# Patient Record
Sex: FEMALE | Race: WHITE
[De-identification: names, ages, dates, MRNs, and addresses within clinical notes are randomized per-mention and may not be internally consistent; named-entity substitution may affect disease eponyms.]

---

## 2017-08-21 ENCOUNTER — HOSPITAL ENCOUNTER (OUTPATIENT)
Dept: HOSPITAL 47 - RADCTMAIN | Age: 42
End: 2017-08-21
Payer: COMMERCIAL

## 2017-08-21 DIAGNOSIS — N83.201: Primary | ICD-10-CM

## 2017-08-21 PROCEDURE — 74177 CT ABD & PELVIS W/CONTRAST: CPT

## 2017-08-21 NOTE — CT
EXAMINATION TYPE: CT abdomen pelvis w con

 

DATE OF EXAM: 8/21/2017

 

COMPARISON: NONE

 

HISTORY: Ovarian cyst and bloating

 

CT DLP: 414.1 mGycm

Automated exposure control for dose reduction was used.

 

CONTRAST: 

CT scan of the abdomen pelvis is performed with IV Contrast, patient injected with 100 mL of Omnipaqu
e 300.

 

FINDINGS- 

LUNG BASES-  No significant abnormality is appreciated. 

 

LIVER/GB-   No gross abnormality is appreciated.

 

PANCREAS-  No gross abnormality is seen. 

SPLEEN-  No gross abnormality is seen. 

 

ADRENALS-  No gross abnormality is seen.

 

KIDNEYS/BLADDER- no hydronephrosis nephrolithiasis or renal mass.

   

BOWEL-  no bowel dilatation.  Normal appendix. 

  

LYMPH NODES-  No greater than 1cm abdominal or pelvic lymph nodes are appreciated. 

 

OSSEOUS STRUCTURES-  No significant abnormality is seen. 

 

OTHER-  slightly lobulated pattern to the lower uterine segment can occasionally be seen with the fib
roid and could be correlated with ultrasound. 

 

IMPRESSION- 

1. Questionable uterine fibroid. Area of mixed density in the left adnexa some of which is hyperdense
 measuring 1.8 cm may be related to previous ovarian cyst, hemorrhagic cyst or ruptured cyst. Correla
te with ultrasound.

## 2017-08-31 ENCOUNTER — HOSPITAL ENCOUNTER (OUTPATIENT)
Dept: HOSPITAL 47 - ORWHC2ENDO | Age: 42
Discharge: HOME | End: 2017-08-31
Attending: SURGERY
Payer: COMMERCIAL

## 2017-08-31 VITALS — RESPIRATION RATE: 16 BRPM | HEART RATE: 62 BPM | DIASTOLIC BLOOD PRESSURE: 73 MMHG | SYSTOLIC BLOOD PRESSURE: 112 MMHG

## 2017-08-31 VITALS — TEMPERATURE: 98.8 F

## 2017-08-31 VITALS — BODY MASS INDEX: 23.7 KG/M2

## 2017-08-31 DIAGNOSIS — K58.9: Primary | ICD-10-CM

## 2017-08-31 DIAGNOSIS — K57.30: ICD-10-CM

## 2017-08-31 DIAGNOSIS — E07.9: ICD-10-CM

## 2017-08-31 DIAGNOSIS — Z80.0: ICD-10-CM

## 2017-08-31 DIAGNOSIS — Z79.899: ICD-10-CM

## 2017-08-31 DIAGNOSIS — K59.00: ICD-10-CM

## 2017-08-31 PROCEDURE — 81025 URINE PREGNANCY TEST: CPT

## 2017-08-31 PROCEDURE — 45378 DIAGNOSTIC COLONOSCOPY: CPT

## 2017-08-31 NOTE — P.GSHP
History of Present Illness


H&P Date: 17


Chief Complaint: Left lower quadrant abdominal pain, screening colonoscopy





This is a 42-year-old female referred from Julieta Mckay.  Patient is today for 

colonoscopy.  Patient has a family history of colon cancer.  She's had problems 

with left lower quadrant abdominal pain.  He had some intermittent constipation.





Past Medical History


Past Medical History: Thyroid Disorder


Additional Past Medical History / Comment(s): spastic colon,


History of Any Multi-Drug Resistant Organisms: None Reported


Past Surgical History: Breast Surgery,  Section, Orthopedic Surgery, 

Tonsillectomy


Additional Past Surgical History / Comment(s): left foot surgery, sander breast 

reduction


Past Anesthesia/Blood Transfusion Reactions: Motion Sickness


Smoking Status: Never smoker





- Past Family History


  ** Mother


Family Medical History: No Reported History





Medications and Allergies


 Home Medications











 Medication  Instructions  Recorded  Confirmed  Type


 


Levothyroxine Sodium [Synthroid] 25 mcg PO DAILY 17 History











 Allergies











Allergy/AdvReac Type Severity Reaction Status Date / Time


 


No Known Allergies Allergy   Verified 17 08:19














Surgical - Exam


 Vital Signs











Temp Pulse Resp BP Pulse Ox


 


 98.8 F   67   16   115/80   99 


 


 17 08:18  17 08:18  17 08:18  17 08:18  17 08:18














- General


well nourished, no distress





- Eyes


PERRL





- ENT


normal pinna





- Neck


no masses





- Respiratory


normal expansion





- Cardiovascular


Rhythm: regular





- Abdomen


Abdomen: soft, non tender





Assessment and Plan


Plan: 





Left lower quadrant abdominal pain.





Change in bowel habits





Family history: Cancer





We'll perform screening colonoscopy

## 2017-09-20 NOTE — P.OP
Date of Procedure: 08/31/17


Preoperative Diagnosis: 


left lower quadrant pain, screening colonoscopy


Postoperative Diagnosis: 


diverticulosis


Procedure(s) Performed: 


colonoscopy


Anesthesia: MAC


Surgeon: Armando Hall


Pathology: none sent


Condition: stable


Disposition: PACU


Description of Procedure: 


the patient was placed on the endoscopy table in the lateral position.  She 

received IV sedation.  Digital rectalexam revealed no abnormalities.the 

colonoscope was then placed the patient in this and passed throughout the 

entire colon.  The ileocecal valve was visualized.  The cecum appeared normal.  

The ascending and transverse colon appeared normal.   In the descending and 

sigmoid colon there was mild diverticulosis.  The scope was then brought back 

the rectum and the sternal.  Scope was then withdrawn for patient.

## 2017-11-07 ENCOUNTER — HOSPITAL ENCOUNTER (OUTPATIENT)
Dept: HOSPITAL 47 - RADMAMWWP | Age: 42
Discharge: HOME | End: 2017-11-07
Payer: COMMERCIAL

## 2017-11-07 DIAGNOSIS — Z12.31: Primary | ICD-10-CM

## 2017-11-08 NOTE — MM
Reason for exam: screening  (asymptomatic).



History:

Reductions of both breasts, 1992.



Physical Findings:

A clinical breast exam by your physician is recommended on an annual basis and 

results should be correlated with mammographic findings.



MG Screening Mammo w CAD

Bilateral CC and MLO view(s) were taken.

No prior studies available for comparison.

The breast tissue is heterogeneously dense. This may lower the sensitivity of 

mammography.  No suspicious abnormality.





ASSESSMENT: Negative, BI-RAD 1



RECOMMENDATION:

Routine screening mammogram of both breasts in 1 year.

## 2019-08-20 ENCOUNTER — HOSPITAL ENCOUNTER (OUTPATIENT)
Dept: HOSPITAL 47 - RADMAMWWP | Age: 44
Discharge: HOME | End: 2019-08-20
Attending: OBSTETRICS & GYNECOLOGY
Payer: COMMERCIAL

## 2019-08-20 DIAGNOSIS — Z12.31: Primary | ICD-10-CM

## 2019-08-20 PROCEDURE — 77067 SCR MAMMO BI INCL CAD: CPT

## 2019-08-22 NOTE — MM
Reason for exam: screening  (asymptomatic).

Last mammogram was performed 1 year and 9 months ago.



History:

Reductions of both breasts, 1992.



Physical Findings:

A clinical breast exam by your physician is recommended on an annual basis and 

results should be correlated with mammographic findings.



MG Screening Mammo w CAD

Bilateral CC and MLO view(s) were taken.

Prior study comparison: November 7, 2017, bilateral MG screening mammo w CAD.

The breast tissue is heterogeneously dense. This may lower the sensitivity of 

mammography.  No significant changes when compared with prior studies.





ASSESSMENT: Benign, BI-RAD 2



RECOMMENDATION:

Routine screening mammogram of both breasts in 1 year.

## 2022-07-26 ENCOUNTER — HOSPITAL ENCOUNTER (OUTPATIENT)
Dept: HOSPITAL 47 - LABWHC1 | Age: 47
Discharge: HOME | End: 2022-07-26
Attending: FAMILY MEDICINE
Payer: COMMERCIAL

## 2022-07-26 DIAGNOSIS — M79.18: Primary | ICD-10-CM

## 2022-07-26 DIAGNOSIS — Z82.0: ICD-10-CM

## 2022-07-26 DIAGNOSIS — Z84.0: ICD-10-CM

## 2022-07-26 LAB — CK SERPL-CCNC: 69 U/L (ref 26–186)

## 2022-07-26 PROCEDURE — 86225 DNA ANTIBODY NATIVE: CPT

## 2022-07-26 PROCEDURE — 86140 C-REACTIVE PROTEIN: CPT

## 2022-07-26 PROCEDURE — 86038 ANTINUCLEAR ANTIBODIES: CPT

## 2022-07-26 PROCEDURE — 86147 CARDIOLIPIN ANTIBODY EA IG: CPT

## 2022-07-26 PROCEDURE — 85652 RBC SED RATE AUTOMATED: CPT

## 2022-07-26 PROCEDURE — 82550 ASSAY OF CK (CPK): CPT

## 2022-07-26 PROCEDURE — 36415 COLL VENOUS BLD VENIPUNCTURE: CPT

## 2022-07-26 PROCEDURE — 86235 NUCLEAR ANTIGEN ANTIBODY: CPT

## 2022-07-27 LAB
CARDIOLIPIN IGG SER-IMP: NEGATIVE
CARDIOLIPIN IGM SER-IMP: NEGATIVE
DSDNA AB SER QL: NEGATIVE
DSDNA AB TITR SER: <1 IU/ML

## 2023-01-19 ENCOUNTER — HOSPITAL ENCOUNTER (OUTPATIENT)
Dept: HOSPITAL 47 - RADMAMWWP | Age: 48
Discharge: HOME | End: 2023-01-19
Attending: FAMILY MEDICINE
Payer: COMMERCIAL

## 2023-01-19 DIAGNOSIS — Z12.31: Primary | ICD-10-CM

## 2023-01-19 PROCEDURE — 77067 SCR MAMMO BI INCL CAD: CPT

## 2023-01-20 NOTE — MM
Reason for Exam: Screening  (asymptomatic). 

Last mammogram was performed 3 year(s) and 5 month(s) ago. 





Patient History: 

Menarche at age 11. First Full-Term Pregnancy at age 22. 1992, Bilateral Reduction. 





Risk Values: 

Prisca 5 year model risk: 0.8%.

NCI Lifetime model risk: 8.2%.





Prior Study Comparison: 

11/7/2017 Bilateral Screening Mammogram, [facility]. 8/20/2019 Bilateral Screening Mammogram, [facility]. 





Tissue Density: 

The breast tissue is heterogeneously dense. This may lower the sensitivity of mammography.





Findings: 

Analyzed By CAD. 

There is no suspicious group of microcalcifications or new suspicious mass in either breast. Benign

calcifications within the right breast. 





Overall Assessment: Benign, BI-RAD 2





Management: 

Screening Mammogram of both breasts in 1 year.

A clinical breast exam by your physician is recommended on an annual basis and results should be

correlated with mammographic findings.



Electronically signed and approved by: Jeancarlos Austin D.O.